# Patient Record
Sex: MALE | Race: WHITE | URBAN - METROPOLITAN AREA
[De-identification: names, ages, dates, MRNs, and addresses within clinical notes are randomized per-mention and may not be internally consistent; named-entity substitution may affect disease eponyms.]

---
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
## 2019-02-20 ENCOUNTER — OFFICE VISIT (OUTPATIENT)
Dept: PEDIATRICS | Facility: CLINIC | Age: 19
End: 2019-02-20
Attending: PEDIATRICS

## 2019-02-20 VITALS
WEIGHT: 131 LBS | HEART RATE: 92 BPM | SYSTOLIC BLOOD PRESSURE: 109 MMHG | BODY MASS INDEX: 17.74 KG/M2 | DIASTOLIC BLOOD PRESSURE: 76 MMHG | HEIGHT: 72 IN

## 2019-02-20 DIAGNOSIS — Z63.5 FAMILY DISRUPTION DUE TO MARITAL ESTRANGEMENT: ICD-10-CM

## 2019-02-20 DIAGNOSIS — F43.10 PTSD (POST-TRAUMATIC STRESS DISORDER): Primary | ICD-10-CM

## 2019-02-20 DIAGNOSIS — F39 MOOD DISORDER (H): ICD-10-CM

## 2019-02-20 DIAGNOSIS — J32.9 SINUSITIS, UNSPECIFIED CHRONICITY, UNSPECIFIED LOCATION: ICD-10-CM

## 2019-02-20 DIAGNOSIS — K08.89 PAIN, DENTAL: ICD-10-CM

## 2019-02-20 DIAGNOSIS — F90.2 ATTENTION DEFICIT HYPERACTIVITY DISORDER (ADHD), COMBINED TYPE: ICD-10-CM

## 2019-02-20 PROCEDURE — G0463 HOSPITAL OUTPT CLINIC VISIT: HCPCS | Mod: ZF

## 2019-02-20 SDOH — SOCIAL STABILITY - SOCIAL INSECURITY: DISRUPTION OF FAMILY BY SEPARATION AND DIVORCE: Z63.5

## 2019-02-20 ASSESSMENT — MIFFLIN-ST. JEOR: SCORE: 1646.72

## 2019-02-20 NOTE — NURSING NOTE
"Chief Complaint   Patient presents with     RECHECK     ADHD     /76   Pulse 92   Ht 5' 11.97\" (182.8 cm)   Wt 131 lb (59.4 kg)   BMI 17.78 kg/m    Yaquelin Do CMA    "

## 2019-02-20 NOTE — LETTER
"  2/20/2019      RE: Mack Purcell  [address]  [place] [address]       SUBJECTIVE:  Mack is a 18 year old male, here for follow-up of developmental-behavioral problems. This was our first face-to-face encounter in several years.     Interim History:    six months ago, he began to have increasing problems with physical and mental health -- he's not sure why it started then    dental infection and pain -- ongoing; lacks insurance    sinus infection and facial pain, drainage into his throat for months; tried abx without benefit from primary care practitioner     stomachaches and \"white streaks [in his stools]\" so bad that \"I was convinced I had worms\" but he now thinks that's unlikely; no weight changes    \"hard to breath\" at night sometimes    occasional passive suicidal ideation     frequent panic symptoms \"for no reason\"    often feels dissociated from reality due to head pain (no hallucinations)    severe onset and maintenance insomnia    difficulty concentrating    intrusive, recurrent thoughts about his body/health problems, the police raid of his house when he was almost 17 years old (for dealing drugs, the charges for which were thrown out due to the [facility]- drug task force falsifying some warrants or something), family of origin discord and history of violence and emotional pain    all of the above symptoms occur off of drugs, he denies drug use except for cannabis most nights \"otherwise I can't fall asleep, and it helps with the pain\"; says that he tries to get it from a reliable [place] source so that he knows what he's getting; his use has decreased greatly, he says, and he also says he has stopped vaping nicotine    living with Dad but both he and Dad are considering him moving out as they don't get along and Mario hasn't been employed for about six months (he was working part-time at [employer] but not making much money -- and feels that his mental and physical health currently impede what " "he can do); he avoids seeing his Mom or his brother because \"it's too chaotic [at her house]\"    he has recently bought a 2002 Volvo SUV 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 -- I just have people that work for me, I don't do anything directly, nothing's at my house\"      ACTIVITIES:  he had a girlfriend last year who inspired him to stop smoking pot so much     he got admitted to [employer] in [facility] and hopes to go there in the fall to study law and/or business; his long-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    Objective:  /76   Pulse 92   Ht 1.828 m (5' 11.97\")   Wt 59.4 kg (131 lb)   BMI 17.78 kg/m      EXAM:  Developmental and Behavioral: affect normal/bright and mood congruent  impulse control appropriate for context  activity level appropriate for context  attention span appropriate for context  social reciprocity appropriate for developmental age  joint attention appropriate for developmental age  no preoccupations, stereotypies, or atypical behavioral mannerisms  judgment and insight intact  mentation appears normal  no evidence of psychosis/hallucinations/delusions  no suicidal ideation  no pressured speech  normal thought processes and thought content    DATA:  The following standardized developmental-behavioral assessments were scored and interpreted today with them, distinct from the rest of the evaluation and management that took place:  1. n/a    As described below, today's Diagnostic ASSESSMENT and Diagnostic/Therapeutic PLAN were discussed with the patient and family, and I provided them with extensive counseling and eduction as follows:  1. PTSD (post-traumatic stress disorder)    2. Mood disorder w anger outbursts    3. Attention deficit hyperactivity disorder (ADHD), combined type    4. Family disruption due to marital estrangement    5. Sinusitis, unspecified chronicity, unspecified location    6. Pain, dental        Overall, " worse. PTSD is a new diagnosis but I do feel strongly that he meets criteria, due to acute stressors on top of chronic stressors and family adversity.    Diagnostic Plan:    rule out substance use disorder     needs further evaluation for his physical and mental health symptoms     Counseled regarding:    self-efficacy    ego-strengthening suggestions    care coordination -- I recommended he sign a medical release for us to communicate as needed with his parents, and he so    psychoeducation about post-traumatic stress disorder and its effects on health and mood    motivational interviewing regarding engaging in individual psychotherapy    motivational interviewing regarding harm reduction for his involvement with illicit substances    recommend Flonase and nasal saline and follow-up with ENT or primary care practitioner regarding sinus, likely needs abx    recommend dental visit ASAP    Therapeutic Interventions:    I will email referrals for individual psychotherapy focused on trauma recovery and associated emotional-behavioral symptoms, after discussing with my colleagues regarding who'd be a good fit    Current Outpatient Medications   Medication Sig Dispense Refill     cloNIDine (CATAPRES-TTS-1) 0.1 MG/24HR patch Place 1 patch onto the skin once a week 4 patch 12     lisdexamfetamine (VYVANSE) 50 MG capsule Take 1 capsule (50 mg) by mouth every morning 30 capsule 0     lisdexamfetamine (VYVANSE) 50 MG capsule Take 1 capsule (50 mg) by mouth every morning 30 capsule 0     lisdexamfetamine (VYVANSE) 50 MG capsule Take 1 capsule (50 mg) by mouth every morning 30 capsule 0     melatonin 5 MG CAPS Take  by mouth.       There are no discontinued medications.      Psychotropic medication deferred but consider Psychiatry referral    Follow-up -- as needed with me    60 minutes and More than 50% of the time spent on counseling / coordinating care    Polo Yi MD, MPH

## 2019-02-20 NOTE — PROGRESS NOTES
"SUBJECTIVE:  Mack is a 18 year old male, here for follow-up of developmental-behavioral problems. This was our first face-to-face encounter in several years.     Interim History:    six months ago, he began to have increasing problems with physical and mental health -- he's not sure why it started then    dental infection and pain -- ongoing; lacks insurance    sinus infection and facial pain, drainage into his throat for months; tried abx without benefit from primary care practitioner     stomachaches and \"white streaks [in his stools]\" so bad that \"I was convinced I had worms\" but he now thinks that's unlikely; no weight changes    \"hard to breath\" at night sometimes    occasional passive suicidal ideation     frequent panic symptoms \"for no reason\"    often feels dissociated from reality due to head pain (no hallucinations)    severe onset and maintenance insomnia    difficulty concentrating    intrusive, recurrent thoughts about his body/health problems, the police raid of his house when he was almost 17 years old (for dealing drugs, the charges for which were thrown out due to the [facility] [place]- drug task force falsifying some warrants or something), family of origin discord and history of violence and emotional pain    all of the above symptoms occur off of drugs, he denies drug use except for cannabis most nights \"otherwise I can't fall asleep, and it helps with the pain\"; says that he tries to get it from a reliable [place] source so that he knows what he's getting; his use has decreased greatly, he says, and he also says he has stopped vaping nicotine    living with Dad but both he and Dad are considering him moving out as they don't get along and Mario hasn't been employed for about six months (he was working part-time at [employer] but not making much money -- and feels that his mental and physical health currently impede what he can do); he avoids seeing his Mom or his brother because \"it's too chaotic [at her " "house]\"    he has recently bought a 2002 Volvo SUV 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 -- I just have people that work for me, I don't do anything directly, nothing's at my house\"      ACTIVITIES:  he had a girlfriend last year who inspired him to stop smoking pot so much     he got admitted to [employer] [facility] in [facility] and hopes to go there in the fall to study law and/or business; his long-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    Objective:  /76   Pulse 92   Ht 1.828 m (5' 11.97\")   Wt 59.4 kg (131 lb)   BMI 17.78 kg/m     EXAM:  Developmental and Behavioral: affect normal/bright and mood congruent  impulse control appropriate for context  activity level appropriate for context  attention span appropriate for context  social reciprocity appropriate for developmental age  joint attention appropriate for developmental age  no preoccupations, stereotypies, or atypical behavioral mannerisms  judgment and insight intact  mentation appears normal  no evidence of psychosis/hallucinations/delusions  no suicidal ideation  no pressured speech  normal thought processes and thought content    DATA:  The following standardized developmental-behavioral assessments were scored and interpreted today with them, distinct from the rest of the evaluation and management that took place:  1. n/a    As described below, today's Diagnostic ASSESSMENT and Diagnostic/Therapeutic PLAN were discussed with the patient and family, and I provided them with extensive counseling and eduction as follows:  1. PTSD (post-traumatic stress disorder)    2. Mood disorder w anger outbursts    3. Attention deficit hyperactivity disorder (ADHD), combined type    4. Family disruption due to marital estrangement    5. Sinusitis, unspecified chronicity, unspecified location    6. Pain, dental        Overall, worse. PTSD is a new diagnosis but I do feel strongly that he meets criteria, due to acute " stressors on top of chronic stressors and family adversity.    Diagnostic Plan:    rule out substance use disorder     needs further evaluation for his physical and mental health symptoms     Counseled regarding:    self-efficacy    ego-strengthening suggestions    care coordination -- I recommended he sign a medical release for us to communicate as needed with his parents, and he so    psychoeducation about post-traumatic stress disorder and its effects on health and mood    motivational interviewing regarding engaging in individual psychotherapy    motivational interviewing regarding harm reduction for his involvement with illicit substances    recommend Flonase and nasal saline and follow-up with ENT or primary care practitioner regarding sinus, likely needs abx    recommend dental visit ASAP    Therapeutic Interventions:    I will email referrals for individual psychotherapy focused on trauma recovery and associated emotional-behavioral symptoms, after discussing with my colleagues regarding who'd be a good fit    Current Outpatient Medications   Medication Sig Dispense Refill     cloNIDine (CATAPRES-TTS-1) 0.1 MG/24HR patch Place 1 patch onto the skin once a week 4 patch 12     lisdexamfetamine (VYVANSE) 50 MG capsule Take 1 capsule (50 mg) by mouth every morning 30 capsule 0     lisdexamfetamine (VYVANSE) 50 MG capsule Take 1 capsule (50 mg) by mouth every morning 30 capsule 0     lisdexamfetamine (VYVANSE) 50 MG capsule Take 1 capsule (50 mg) by mouth every morning 30 capsule 0     melatonin 5 MG CAPS Take  by mouth.       There are no discontinued medications.      Psychotropic medication deferred but consider Psychiatry referral    Follow-up -- as needed with me    60 minutes and More than 50% of the time spent on counseling / coordinating care    Polo Yi MD, MPH  , [facility]  Developmental-Behavioral  Pediatrics  __________________________________________________________

## 2019-02-22 PROBLEM — F43.10 PTSD (POST-TRAUMATIC STRESS DISORDER): Status: ACTIVE | Noted: 2019-02-22

## 2019-02-22 PROBLEM — J32.9 SINUSITIS, UNSPECIFIED CHRONICITY, UNSPECIFIED LOCATION: Status: ACTIVE | Noted: 2019-02-22

## 2019-02-22 PROBLEM — K08.89 PAIN, DENTAL: Status: ACTIVE | Noted: 2019-02-22

## 2019-10-23 ENCOUNTER — OFFICE VISIT (OUTPATIENT)
Dept: PEDIATRICS | Facility: CLINIC | Age: 19
End: 2019-10-23
Attending: PEDIATRICS
Payer: COMMERCIAL

## 2019-10-23 VITALS
BODY MASS INDEX: 19.65 KG/M2 | WEIGHT: 145.1 LBS | SYSTOLIC BLOOD PRESSURE: 131 MMHG | HEART RATE: 83 BPM | DIASTOLIC BLOOD PRESSURE: 78 MMHG | HEIGHT: 72 IN

## 2019-10-23 DIAGNOSIS — F43.10 PTSD (POST-TRAUMATIC STRESS DISORDER): Primary | ICD-10-CM

## 2019-10-23 DIAGNOSIS — F90.2 ATTENTION DEFICIT HYPERACTIVITY DISORDER (ADHD), COMBINED TYPE: ICD-10-CM

## 2019-10-23 DIAGNOSIS — F39 MOOD DISORDER (H): ICD-10-CM

## 2019-10-23 PROCEDURE — G0463 HOSPITAL OUTPT CLINIC VISIT: HCPCS | Mod: ZF

## 2019-10-23 ASSESSMENT — MIFFLIN-ST. JEOR: SCORE: 1711.3

## 2019-10-23 NOTE — LETTER
PEDS DEVELOPMENTAL BEHAVIORAL CLINIC  [place] [id number]  [address], [address]  [facility] [address]  Phone: [phone]  Fax: [phone]       October 23, 2019      Re: Mack Purcell  YOB: 2000      To Whom It May Concern:    As you may be aware, it is medically necessary for Mr. Purcell to return to [place] for medical care for ongoing chronic medical conditions that require intensive therapies and surgery that cannot be accomplished elsewhere. Please contact me with any other questions.    Sincerely,    Polo Yi MD, MPH, FAAP   & Fellowship Director  Developmental-Behavioral Pediatrics    Email: [email or web address]

## 2019-10-23 NOTE — PROGRESS NOTES
"SUBJECTIVE:  Mack is a 19 year old male, here for follow-up of developmental-behavioral problems.    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 Barron, in [place] -- plans to have surgery for chronic sinus problems    also saw a neurologist who prescribed naproxen and nortriptyline for head and neck pains, after negative brain MRI    regained lost weight    did not establish care with a therapist as yet    he started at [employer] in [place], \"too hard to concentrate\" due to light sensitivity; so he moved back to [place], living with his girlfriend and her 2 best friends in [facility]    he plans to start [employer] in the fall, got into [employer] but can't afford it so will go to [employer]    working 2 part-time jobs ([employer] and a [employer]); 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 n/a    As described below, today's Diagnostic ASSESSMENT and Diagnostic/Therapeutic PLAN were discussed with the patient and family, and I provided them with extensive counseling and eduction as follows:  1. PTSD (post-traumatic stress disorder)    2. Attention deficit hyperactivity disorder (ADHD), combined type    3. Mood disorder w anger outbursts        Overall, stable/unchanged--problematic.    Diagnostic Plan:    deferred     Counseled regarding:    self-efficacy    guidance and education regarding multimodal, evidence-based interventions for post-traumatic stress " disorder     letter written for his former college regarding medical necessity for treatments in [place]    Therapeutic Interventions:    again strongly recommend trauma-focused cognitive behavioral therapy -- see After-Visit Summary     Current Outpatient Medications   Medication Sig Dispense Refill     melatonin 5 MG CAPS Take  by mouth.       There are no discontinued medications.      Continue current medications without change.     Follow-up -- as needed    25 minutes and More than 50% of the time spent on counseling / coordinating care    Polo Yi MD, MPH  , [facility]  Developmental-Behavioral Pediatrics  __________________________________________________________

## 2019-10-23 NOTE — LETTER
"  10/23/2019      RE: Mack Purcell  [address]  [place] [address]       SUBJECTIVE:  Mack is a 19 year old male, here for follow-up of developmental-behavioral problems.    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 Barron, in [place] -- plans to have surgery for chronic sinus problems    also saw a neurologist who prescribed naproxen and nortriptyline for head and neck pains, after negative brain MRI    regained lost weight    did not establish care with a therapist as yet    he started at [employer]  in [place], \"too hard to concentrate\" due to light sensitivity; so he moved back to [place], living with his girlfriend and her 2 best friends in [facility]    he plans to start [employer] in the fall, got into [employer] school but can't afford it so will go to [employer]    working 2 part-time jobs ([employer] and a [employer]); 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 n/a    As described below, today's Diagnostic ASSESSMENT and Diagnostic/Therapeutic PLAN were discussed with the patient and family, and I provided them with extensive counseling and eduction as follows:  1. PTSD (post-traumatic stress disorder)    2. Attention deficit hyperactivity disorder (ADHD), combined type    3. Mood disorder w anger outbursts        Overall, stable/unchanged--problematic.    Diagnostic Plan:    deferred     Counseled regarding:    self-efficacy    guidance and " education regarding multimodal, evidence-based interventions for post-traumatic stress disorder     letter written for his former college regarding medical necessity for treatments in [place]    Therapeutic Interventions:    again strongly recommend trauma-focused cognitive behavioral therapy -- see After-Visit Summary     Current Outpatient Medications   Medication Sig Dispense Refill     melatonin 5 MG CAPS Take  by mouth.       There are no discontinued medications.      Continue current medications without change.     Follow-up -- as needed    25 minutes and More than 50% of the time spent on counseling / coordinating care    Polo Yi MD, MPH  , [facility]  Developmental-Behavioral Pediatrics  __________________________________________________________         Polo Yi MD

## 2019-10-23 NOTE — PATIENT INSTRUCTIONS
"    Thank you for choosing the [facility] s Developmental and Behavioral Pediatrics Department for your care!     To Schedule appointments please contact the [facility] at [phone].   For refills please call the [facility] [phone] or contact us via your [employer] account.  Please allow 5-7 days for your refill request to be processed and sent to your pharmacy.   For behavioral emergencies (immediate concern for your child s safety or the safety of another) please contact the [facility] at [phone], go to your local Emergency Department or call 911.     For non-emergencies contact the [facility] at [phone] or reach out to us via [employer]. Please allow 3 business days for a response.    There is a therapeutic strategy that's helpful called \"Prolonged Exposure for Complex Trauma,\" so you might want to ask therapists about whether they provide that. There is also EMDR, which as you may know is another technique for recovering from specific traumatic events, so that might be another one to ask about.     I don't know any of these therapists personally, although Trinidad Miguel rang a hudson -- but these were the people or groups that were recommended by some of the folks I checked with.    Trinidad Miguel at [employer] in [place]  Jas Bowden or Hien King at [email or web address] in [place] or Ashley Puentes-Jonel, [phone], [email or web address], in [place]  [email or web address] - [email or web address] - [place]  "

## 2019-10-23 NOTE — NURSING NOTE
"Chief Complaint   Patient presents with     RECHECK     PTSD, ADHD     /78   Pulse 83   Ht 6' 0.32\" (183.7 cm)   Wt 145 lb 1.6 oz (65.8 kg)   BMI 19.50 kg/m      Yaquelin Do CMA    "

## 2020-03-01 ENCOUNTER — HEALTH MAINTENANCE LETTER (OUTPATIENT)
Age: 20
End: 2020-03-01

## 2020-12-13 ENCOUNTER — HEALTH MAINTENANCE LETTER (OUTPATIENT)
Age: 20
End: 2020-12-13

## 2021-04-17 ENCOUNTER — HEALTH MAINTENANCE LETTER (OUTPATIENT)
Age: 21
End: 2021-04-17

## 2021-10-02 ENCOUNTER — HEALTH MAINTENANCE LETTER (OUTPATIENT)
Age: 21
End: 2021-10-02

## 2022-05-08 ENCOUNTER — HEALTH MAINTENANCE LETTER (OUTPATIENT)
Age: 22
End: 2022-05-08

## 2023-01-14 ENCOUNTER — HEALTH MAINTENANCE LETTER (OUTPATIENT)
Age: 23
End: 2023-01-14

## 2023-06-02 ENCOUNTER — HEALTH MAINTENANCE LETTER (OUTPATIENT)
Age: 23
End: 2023-06-02